# Patient Record
Sex: MALE | Race: OTHER | HISPANIC OR LATINO | URBAN - METROPOLITAN AREA
[De-identification: names, ages, dates, MRNs, and addresses within clinical notes are randomized per-mention and may not be internally consistent; named-entity substitution may affect disease eponyms.]

---

## 2023-09-28 ENCOUNTER — EMERGENCY (EMERGENCY)
Facility: HOSPITAL | Age: 78
LOS: 1 days | Discharge: HOME | End: 2023-09-28
Admitting: EMERGENCY MEDICINE
Payer: MEDICARE

## 2023-09-28 VITALS
TEMPERATURE: 98 F | OXYGEN SATURATION: 99 % | RESPIRATION RATE: 18 BRPM | SYSTOLIC BLOOD PRESSURE: 127 MMHG | HEART RATE: 98 BPM | DIASTOLIC BLOOD PRESSURE: 69 MMHG

## 2023-09-28 VITALS
WEIGHT: 169.98 LBS | HEART RATE: 107 BPM | OXYGEN SATURATION: 98 % | DIASTOLIC BLOOD PRESSURE: 72 MMHG | SYSTOLIC BLOOD PRESSURE: 145 MMHG | RESPIRATION RATE: 18 BRPM | TEMPERATURE: 98 F

## 2023-09-28 PROCEDURE — 99282 EMERGENCY DEPT VISIT SF MDM: CPT

## 2023-09-28 PROCEDURE — 99283 EMERGENCY DEPT VISIT LOW MDM: CPT

## 2023-09-28 NOTE — ED PROVIDER NOTE - PHYSICAL EXAMINATION
CONSTITUTIONAL: NAD   SKIN: Normal color and turgor.    HEAD: NC/AT.  EYES: Conjunctiva clear. EOMI. PERRL.    ENT: Airway clear. Normal voice.  Ext ears and TM normal bilat.  RESPIRATORY:  Normal work of breathing. Lungs CTAB.  CARDIOVASCULAR:  RRR, S1S2. No M/R/G.      GI:  Abdomen soft, nontender.    MSK: Neck supple.  No LE edema or calf tenderness. No joint swelling or ROM limitation.  NEURO: Alert; CN: grossly intact. Speech clear.  [place]ON[place]. Gait steady.

## 2023-09-28 NOTE — ED PROVIDER NOTE - CLINICAL SUMMARY MEDICAL DECISION MAKING FREE TEXT BOX
Chronic sinusitis, no evidence of acute sinusitis that would warrant change in current antibiotic regimen or admission for emergency surgery.  Will refer patient to ED referral coordinator to help expedite ENT follow-up.

## 2023-09-28 NOTE — ED ADULT NURSE NOTE - NSFALLUNIVINTERV_ED_ALL_ED
Bed/Stretcher in lowest position, wheels locked, appropriate side rails in place/Call bell, personal items and telephone in reach/Instruct patient to call for assistance before getting out of bed/chair/stretcher/Non-slip footwear applied when patient is off stretcher/[place] to call system/Physically safe environment - no spills, clutter or unnecessary equipment/Purposeful proactive rounding/Room/bathroom lighting operational, light cord in reach

## 2023-09-28 NOTE — ED ADULT NURSE NOTE - OBJECTIVE STATEMENT
patient arrived to the ER c.o nasal congestion for the past 10xdays with associated HA, throat pain for the past 5xmonths. Pt at this moment denies any other medical complaints. Pt is noted to be aox3, able to maintain airway, having nonlabored breathing, no retractions noted, non diaphoretic and able to talk in clear full sentences.

## 2023-09-28 NOTE — ED PROVIDER NOTE - NSFOLLOWUPINSTRUCTIONS_ED_ALL_ED_FT
Continue taking your cetirizine and antibiotic (amoxicillin-clavulanate) as prescribed.    We will have our ED Referral Coordinator help to arrange appointment with otolaryngologist.  Return to ED for new or worsening symptoms, or any concerns.  ------------------------------  Contaremos con la ayuda de nuestro Coordinador de referencias de urgencias para concertar bharat denis con el otorrinolaringólogo.  Regrese al servicio de urgencias si presenta síntomas nuevos o que empeoran, o si tiene alguna inquietud.  ==================  Sinusitis    LO QUE NECESITA SABER:    ¿Qué es la sinusitis?La sinusitis es la inflamación o infección de los senos paranasales. La mayoría de las veces, la causa de la sinusitis es un virus. La sinusitis aguda podría durar hasta 12 semanas. La sinusitis crónica dura más de 12 semanas. La sinusitis recurrente significa que tiene 4 o más infecciones en 1 año.  Senos paranasales    ¿Qué aumenta mi riesgo de tener sinusitis?    Las condiciones de lashaun, ally las infecciones de las vías respiratorias superiores, las alergias, el asma o la fibrosis quística    Las infecciones dentales o procedimientos, tales ally las infecciones de encías, caries o un conducto radicular    Fumar cigarrillos o la exposición involuntaria al humo del cigarrillo    Las estructuras anormales de los senos paranasales, ally bultos en la nariz, inflamación de las amígdalas o desviación del tabique nasal    Un sistema inmunológico débil debido a las enfermedades ally la diabetes o el VIH  ¿Cuáles son los signos y síntomas de la sinusitis?    Fiebre    Dolor, presión, enrojecimiento o inflamación alrededor de la frente, las mejillas o los ojos    Secreción espesa de color amarillo o asher que sale de la nariz    Sensibilidad al tocarse el tanesha encima de los senos paranasales    Tos seca que ocurre mayormente por la noche o al acostarse    Dolor de halima y en el tanesha que empeora al inclinarse hacia adelante    Dolor en los dientes o al masticar  ¿Cómo se diagnostica la sinusitis?Dan médico lo examinará y le hará preguntas acerca de autumn síntomas. Es posible que use un espéculo nasal para revisar dentro de la nariz. Es posible que usted necesite hacerse alguno de los siguientes estudios:    Bharat muestra de moco de la narizpuede llegar a revelar el germen que está causando dan infección.    Bharat TAC o bharat RMN de la cabezapueden mostrar el revestimiento mucoso de los senos paranasales. Es probable que le administren un líquido de contraste para que los senos paranasales se vean mejor en las imágenes. Informe a dan médico si alguna vez tuvo bharat reacción alérgica a un medio de contraste. No entre a la duyen donde se realiza la resonancia magnética con algo de metal. El metal puede causar lesiones serias. Informe a dna médico si tiene algún objeto metálico en o dentro de dan cuerpo.  ¿Cómo se trata la sinusitis?Autumn síntomas podrían desaparecer por sí solos. Dan médico puede recomendar bharat espera atenta hasta 10 días antes de iniciar el tratamiento con antibióticos. Es posible que usted necesite alguno de los siguientes:    Acetaminofénalivia el dolor y baja la fiebre. Está disponible sin receta médica. Pregunte la cantidad y la frecuencia con que debe tomarlos. Siga las indicaciones. Teagan las etiquetas de todos los demás medicamentos que esté usando para saber si también contienen acetaminofén, o pregunte a dan médico o farmacéutico. El acetaminofén puede causar daño en el hígado cuando no se mark de forma correcta.    AINEcomo el ibuprofeno, ayudan a disminuir la inflamación, el dolor y la fiebre. Adrianne medicamento está disponible con o sin bharat receta médica. Los BISMARK pueden causar sangrado estomacal o problemas renales en ciertas personas. Si usted mark un medicamento anticoagulante, siempre pregúntele a dan médico si los BISMARK son seguros para usted. Siempre teagan la etiqueta de adrianne medicamento y siga las instrucciones.    Los aerosoles nasales con esteroidespodrían ayudar a disminuir la inflamación de dan nariz y senos paranasales.    Los descongestivosayudan a reducir la inflamación y a drenar la mucosidad en la nariz y los senos paranasales. Los descongestionantes podrían ayudarle a respirar más fácilmente.    Los antihistamínicosayudan a secar la mucosidad en dan nariz y a aliviar los estornudos.    Los antibióticosayudan a tratar o prevenir infecciones bacterianas.  ¿Cómo puedo controlar los síntomas?    Enjuáguese los senos paranasales ally se lo hayan indicado.Use un aparato para enjuagar autumn fosas nasales con bharat solución salina (agua salada) o agua destilada. No use agua de la llave. Nitro ayudará a diluir la mucosidad en la nariz eliminando el polen y la suciedad. También ayudará a reducir la inflamación para que usted pueda respirar normalmente.    Use un humidificadorpara aumentar el nivel de humedad en el aire de dan hogar. Nitro podría ayudarle a respirar más fácilmente y al mismo tiempo disminuir la tos.    Duerma con la halima elevada.Coloque bharat almohada adicional debajo de dan halima antes de dormir para ayudar a drenar autumn senos paranasales.    [place] líquidos ally se le haya indicado.Pregunte a dan médico sobre la cantidad de líquido que necesita ulysses todos los josh y cuáles le recomienda. Los líquidos van a diluir la mucosidad en dan nariz y ayudan a drenarla. Evite las bebidas que contienen alcohol o cafeína.    No fume y evite el humo de segunda mano.La nicotina y otros químicos en los cigarrillos y cigarros pueden empeorar autumn síntomas. Pida información a dan médico si usted actualmente fuma y necesita ayuda para dejar de fumar. Los cigarrillos electrónicos o el tabaco sin humo igualmente contienen nicotina. Consulte con dan médico antes de utilizar estos productos.  ¿Cómo puedo evitar la propagación de gérmenes?    Lávese las ian frecuentemente con agua y jabón.Lávese las ian después de usar el baño, cambiarle el pañal a un deniz o estornudar. Lávese las ian antes de comer o preparar alimentos.  Lavado de ian      Aléjese de la gente enferma.Algunos microbios se propagan fácil y rápidamente con el contacto.  ¿Cuándo dionte buscar atención inmediata?    Tiene dificultad para respirar o sibilancia que empeora.    Usted tiene el sneha rígido, fiebre o un shasha dolor de halima.    Usted no puede abrir dan alisa.    Dan globo ocular sobresale o usted no puede  dan alisa.    Usted tiene más sueño de lo normal o nota cambios en dan habilidad de pensar, moverse o hablar.    Usted tiene inflamada la frente o el cuero cabelludo.  ¿Cuándo dionte llamar a mi médico?    Usted tiene cambios en la visión, ally visión doble.    Dan alisa y párpado están enrojecidos, inflamados y adoloridos.    Autumn síntomas no mejoran ni desaparecen después de 10 días.    Usted tiene náuseas y vómitos.    Le sangra la nariz.    Usted tiene preguntas o inquietudes acerca de dan condición o cuidado.  ACUERDOS SOBRE DAN CUIDADO:    Usted tiene el derecho de ayudar a planear dan cuidado. Aprenda todo lo que pueda sobre dan condición y ally darle tratamiento. Discuta autumn opciones de tratamiento con autumn médicos para decidir el cuidado que usted desea recibir. Usted siempre tiene el derecho de rechazar el tratamiento.

## 2023-09-28 NOTE — ED PROVIDER NOTE - PATIENT PORTAL LINK FT
You can access the FollowMyHealth Patient Portal offered by [facility] by registering at the following website: [email or web address]. By joining [employer]’s FollowMyHealth portal, you will also be able to view your health information using other applications (apps) compatible with our system.

## 2023-09-28 NOTE — ED ADULT TRIAGE NOTE - CHIEF COMPLAINT QUOTE
Pt c/o sinus discomfort x 10 years, worsening over past year. Pt denies fevers, HA, dizziness. PMH HTN, DM.

## 2023-09-28 NOTE — ED PROVIDER NOTE - OBJECTIVE STATEMENT
78-year-old male with a history of hypertension, diabetes, chronic sinusitis   presents to ED requesting referral to otolaryngologist.  Patient states he has been seen ENT specialist for the past 10 years for his sinusitis; he has always been treated with antibiotics, and his care has been in both [place] [facility] [place] and in [facility] where he lives currently; comes here now because he is tired of having treatment only with antibiotics and he wants to have surgery with an ear nose and throat specialist; he came here after speaking to someone at his insurance company and they told him that he should go through the emergency room for emergency surgery.  He describes a chronic head and sinus pressure, nasal drainage with thick yellow mucus, cough with yellowish-greenish sputum;  duration of his most current symptoms approximately 2 months.  In the past month he has been on 3 different antibiotics: Levaquin, doxycycline, and now on Augmentin for the past 2 to 3 weeks.  He had a CT scan in June showing bilateral ethmoid sinusitis, bilateral maxillary sinusitis, bilateral sphenoid sinusitis; the ostiomeatal complexes were patent bilaterally.    PMHx DM HTN, chronic sinusitis, vertigo  PSHx lumbar fusion, cervical fusion, appendectomy  Meds  glipizide metformin amodipine lisinopril  NKDA

## 2023-09-30 DIAGNOSIS — E11.9 TYPE 2 DIABETES MELLITUS WITHOUT COMPLICATIONS: ICD-10-CM

## 2023-09-30 DIAGNOSIS — J32.9 CHRONIC SINUSITIS, UNSPECIFIED: ICD-10-CM

## 2023-09-30 DIAGNOSIS — R09.81 NASAL CONGESTION: ICD-10-CM

## 2023-09-30 DIAGNOSIS — I10 ESSENTIAL (PRIMARY) HYPERTENSION: ICD-10-CM
